# Patient Record
Sex: FEMALE | Race: WHITE | NOT HISPANIC OR LATINO | Employment: OTHER | URBAN - METROPOLITAN AREA
[De-identification: names, ages, dates, MRNs, and addresses within clinical notes are randomized per-mention and may not be internally consistent; named-entity substitution may affect disease eponyms.]

---

## 2023-04-02 DIAGNOSIS — E55.9 VITAMIN D DEFICIENCY, UNSPECIFIED: ICD-10-CM

## 2023-04-02 RX ORDER — ERGOCALCIFEROL 1.25 MG/1
CAPSULE ORAL
Qty: 14 CAPSULE | Refills: 1 | Status: SHIPPED | OUTPATIENT
Start: 2023-04-02

## 2023-05-26 DIAGNOSIS — L70.0 ACNE VULGARIS: ICD-10-CM

## 2023-05-26 RX ORDER — TRETINOIN 1 MG/G
CREAM TOPICAL
Qty: 45 G | Refills: 11 | Status: SHIPPED | OUTPATIENT
Start: 2023-05-26

## 2023-05-26 RX ORDER — TRETINOIN 1 MG/G
CREAM TOPICAL
COMMUNITY
End: 2023-07-11 | Stop reason: ALTCHOICE

## 2023-07-03 ENCOUNTER — PATIENT OUTREACH (OUTPATIENT)
Dept: PRIMARY CARE | Facility: CLINIC | Age: 23
End: 2023-07-03
Payer: COMMERCIAL

## 2023-07-03 DIAGNOSIS — N30.01 ACUTE CYSTITIS WITH HEMATURIA: Primary | ICD-10-CM

## 2023-07-03 RX ORDER — SULFAMETHOXAZOLE AND TRIMETHOPRIM 800; 160 MG/1; MG/1
TABLET ORAL EVERY 12 HOURS
COMMUNITY
Start: 2023-06-30 | End: 2023-07-10

## 2023-07-03 RX ORDER — NITROFURANTOIN 25; 75 MG/1; MG/1
100 CAPSULE ORAL 2 TIMES DAILY
Qty: 14 CAPSULE | Refills: 0 | Status: SHIPPED | OUTPATIENT
Start: 2023-07-03 | End: 2023-07-10

## 2023-07-03 NOTE — PROGRESS NOTES
"Discharge Facility: [facility]  Discharge Diagnosis:  Final Discharge Diagnoses: Nausea  Pyelonephritis sepsis.  Sepsis present on admission diagnosed clinically  Anemia  Hyperthyroidism on methimazole  PCOS  Anxiety  Depression  Severe obesity   Admission Date: 6-27-23  Discharge Date: 6-30-23    PCP Appointment Date: Message sent to office   Specialist Appointment Date:   Hospital Encounter and Summary: Linked   See discharge assessment below for further details  TCM complete. Patient is NOT scheduled for a hospital follow up due to no available appointments, task to office.  Patient discharged (6-30-23).  In order to bill as a TCM, the patient needs to be seen by (7-14-23).    Moderately complex & follow-up within 14 days- bill [id number] for hospital follow up.   Highly complex and follow-up visit within 7 days-can bill [id number] for hospital follow up    *virtual follow up needs modifier added (95 or GT)   *AWV AND TCM CAN BE BILLED TOGETHER WITH 25 MODIFIER   Engagement  Call Start Time: 1200 (7/3/2023 12:01 PM)    Medications  Medications reviewed with patient/caregiver?: Yes (7/3/2023 12:01 PM)  Is the patient having any side effects they believe may be caused by any medication additions or changes?: (!) Yes (7/3/2023 12:01 PM)  Does the patient have all medications ordered at discharge?: Yes (7/3/2023 12:01 PM)  Prescription Comments: Pt. taking Bactrim DS 800mg-160 and reports it is making her \"sick\" (7/3/2023 12:01 PM)  Is the patient taking all medications as directed (includes completed medication regime)?: Yes (7/3/2023 12:01 PM)  Care Management Interventions: Provided patient education; Notified provider (7/3/2023 12:01 PM)    Appointments  Does the patient have a primary care provider?: Yes (7/3/2023 12:01 PM)  Care Management Interventions: Educated patient on importance of making appointment (7/3/2023 12:01 PM)    Self Management  Has home health visited the patient within 72 hours of discharge?: Not " applicable (7/3/2023 12:01 PM)    Patient Teaching  Does the patient have access to their discharge instructions?: Yes (7/3/2023 12:01 PM)  Care Management Interventions: Reviewed instructions with patient (7/3/2023 12:01 PM)  What is the patient's perception of their health status since discharge?: Improving (7/3/2023 12:01 PM)  Is the patient/caregiver able to teach back the hierarchy of who to call/visit for symptoms/problems? PCP, Specialist, Home Health nurse, Urgent Care, ED, 911: Yes (7/3/2023 12:01 PM)      Chris Camarillo LPN

## 2023-07-10 PROBLEM — E53.8 VITAMIN B 12 DEFICIENCY: Status: ACTIVE | Noted: 2023-07-10

## 2023-07-10 PROBLEM — E55.9 VITAMIN D DEFICIENCY: Status: ACTIVE | Noted: 2023-07-10

## 2023-07-10 PROBLEM — F41.9 ANXIETY: Status: ACTIVE | Noted: 2023-07-10

## 2023-07-10 PROBLEM — E28.2 PCOS (POLYCYSTIC OVARIAN SYNDROME): Status: ACTIVE | Noted: 2023-07-10

## 2023-07-10 PROBLEM — L70.0 CYSTIC ACNE: Status: ACTIVE | Noted: 2023-07-10

## 2023-07-10 PROBLEM — E05.00 GRAVES DISEASE: Status: ACTIVE | Noted: 2023-07-10

## 2023-07-10 PROBLEM — F50.2: Status: ACTIVE | Noted: 2023-07-10

## 2023-07-10 PROBLEM — D64.9 ANEMIA: Status: ACTIVE | Noted: 2023-07-10

## 2023-07-10 RX ORDER — DESVENLAFAXINE 50 MG/1
TABLET, EXTENDED RELEASE ORAL
COMMUNITY
End: 2023-07-11 | Stop reason: ALTCHOICE

## 2023-07-10 RX ORDER — BUSPIRONE HYDROCHLORIDE 15 MG/1
1 TABLET ORAL 3 TIMES DAILY
COMMUNITY
Start: 2019-12-10 | End: 2023-07-11 | Stop reason: ALTCHOICE

## 2023-07-10 RX ORDER — METHIMAZOLE 5 MG/1
1 TABLET ORAL DAILY
COMMUNITY
Start: 2021-04-06

## 2023-07-10 RX ORDER — HYDROXYZINE HYDROCHLORIDE 25 MG/1
TABLET, FILM COATED ORAL 3 TIMES DAILY PRN
COMMUNITY

## 2023-07-11 ENCOUNTER — OFFICE VISIT (OUTPATIENT)
Dept: PRIMARY CARE | Facility: CLINIC | Age: 23
End: 2023-07-11
Payer: COMMERCIAL

## 2023-07-11 VITALS
SYSTOLIC BLOOD PRESSURE: 116 MMHG | WEIGHT: 256 LBS | HEIGHT: 65 IN | HEART RATE: 88 BPM | OXYGEN SATURATION: 99 % | BODY MASS INDEX: 42.65 KG/M2 | TEMPERATURE: 97 F | DIASTOLIC BLOOD PRESSURE: 80 MMHG

## 2023-07-11 DIAGNOSIS — N12 PYELONEPHRITIS: Primary | ICD-10-CM

## 2023-07-11 DIAGNOSIS — D64.9 ANEMIA, UNSPECIFIED TYPE: ICD-10-CM

## 2023-07-11 PROBLEM — F41.8 DEPRESSION WITH ANXIETY: Status: ACTIVE | Noted: 2023-07-11

## 2023-07-11 PROBLEM — R06.02 SHORTNESS OF BREATH: Status: ACTIVE | Noted: 2023-07-11

## 2023-07-11 PROBLEM — R53.83 MALAISE AND FATIGUE: Status: ACTIVE | Noted: 2023-07-11

## 2023-07-11 PROBLEM — R10.9 FLANK PAIN: Status: ACTIVE | Noted: 2023-07-11

## 2023-07-11 PROBLEM — J06.9 UPPER RESPIRATORY INFECTION, ACUTE: Status: ACTIVE | Noted: 2023-07-11

## 2023-07-11 PROBLEM — B34.9 VIRAL ILLNESS: Status: ACTIVE | Noted: 2023-07-11

## 2023-07-11 PROBLEM — F07.81 POST CONCUSSIVE SYNDROME: Status: ACTIVE | Noted: 2023-07-11

## 2023-07-11 PROBLEM — L30.9 DERMATITIS: Status: ACTIVE | Noted: 2023-07-11

## 2023-07-11 PROBLEM — E05.90 HYPERTHYROIDISM: Status: ACTIVE | Noted: 2023-07-11

## 2023-07-11 PROBLEM — R79.89 LOW TSH LEVEL: Status: ACTIVE | Noted: 2023-07-11

## 2023-07-11 PROBLEM — H60.542 ECZEMATOID OTITIS EXTERNA OF LEFT EAR: Status: ACTIVE | Noted: 2023-07-11

## 2023-07-11 PROBLEM — H53.9 CHANGE IN VISION: Status: ACTIVE | Noted: 2023-07-11

## 2023-07-11 PROBLEM — A41.9 SEPSIS (MULTI): Status: ACTIVE | Noted: 2023-07-11

## 2023-07-11 PROBLEM — B36.0 TINEA VERSICOLOR: Status: ACTIVE | Noted: 2023-07-11

## 2023-07-11 PROBLEM — R53.81 MALAISE AND FATIGUE: Status: ACTIVE | Noted: 2023-07-11

## 2023-07-11 PROBLEM — Z20.822 SUSPECTED COVID-19 VIRUS INFECTION: Status: ACTIVE | Noted: 2023-07-11

## 2023-07-11 PROBLEM — B34.9 NONSPECIFIC SYNDROME SUGGESTIVE OF VIRAL ILLNESS: Status: ACTIVE | Noted: 2023-07-11

## 2023-07-11 PROBLEM — K21.9 GERD (GASTROESOPHAGEAL REFLUX DISEASE): Status: ACTIVE | Noted: 2023-07-11

## 2023-07-11 PROBLEM — J06.9 URI WITH COUGH AND CONGESTION: Status: ACTIVE | Noted: 2023-07-11

## 2023-07-11 PROBLEM — R10.9 ABDOMINAL PAIN: Status: ACTIVE | Noted: 2023-07-11

## 2023-07-11 PROBLEM — R11.0 NAUSEA IN ADULT: Status: ACTIVE | Noted: 2023-07-11

## 2023-07-11 PROBLEM — Z20.822 EXPOSURE TO 2019 NOVEL CORONAVIRUS: Status: ACTIVE | Noted: 2023-07-11

## 2023-07-11 PROBLEM — Z90.49 STATUS POST CHOLECYSTECTOMY: Status: ACTIVE | Noted: 2023-07-11

## 2023-07-11 PROBLEM — L70.0 ACNE VULGARIS: Status: ACTIVE | Noted: 2023-07-11

## 2023-07-11 PROBLEM — J30.1 SEASONAL ALLERGIC RHINITIS DUE TO POLLEN: Status: ACTIVE | Noted: 2023-07-11

## 2023-07-11 PROCEDURE — 3008F BODY MASS INDEX DOCD: CPT | Performed by: FAMILY MEDICINE

## 2023-07-11 PROCEDURE — 99214 OFFICE O/P EST MOD 30 MIN: CPT | Performed by: FAMILY MEDICINE

## 2023-07-11 PROCEDURE — 1036F TOBACCO NON-USER: CPT | Performed by: FAMILY MEDICINE

## 2023-07-11 RX ORDER — ALBUTEROL SULFATE 90 UG/1
AEROSOL, METERED RESPIRATORY (INHALATION)
COMMUNITY
Start: 2022-01-20

## 2023-07-11 RX ORDER — ONDANSETRON 4 MG/1
TABLET, ORALLY DISINTEGRATING ORAL EVERY 6 HOURS
COMMUNITY
Start: 2023-06-30

## 2023-07-11 RX ORDER — PREDNISONE 20 MG/1
TABLET ORAL
COMMUNITY
Start: 2022-10-17 | End: 2023-07-11 | Stop reason: ALTCHOICE

## 2023-07-11 RX ORDER — MELOXICAM 7.5 MG/1
1 TABLET ORAL 2 TIMES DAILY
COMMUNITY
Start: 2022-10-19 | End: 2023-07-11 | Stop reason: ALTCHOICE

## 2023-07-11 RX ORDER — CYCLOBENZAPRINE HCL 10 MG
1 TABLET ORAL DAILY
COMMUNITY
Start: 2022-03-05 | End: 2023-07-11 | Stop reason: ALTCHOICE

## 2023-07-11 RX ORDER — CHLORHEXIDINE GLUCONATE ORAL RINSE 1.2 MG/ML
SOLUTION DENTAL
COMMUNITY
Start: 2022-11-02 | End: 2023-07-11 | Stop reason: ALTCHOICE

## 2023-07-11 RX ORDER — COLISTIN SULFATE, NEOMYCIN SULFATE, THONZONIUM BROMIDE AND HYDROCORTISONE ACETATE 3; 3.3; .5; 1 MG/ML; MG/ML; MG/ML; MG/ML
SUSPENSION AURICULAR (OTIC) 4 TIMES DAILY
COMMUNITY
Start: 2022-10-12 | End: 2023-07-11 | Stop reason: ALTCHOICE

## 2023-07-11 RX ORDER — BENZONATATE 200 MG/1
CAPSULE ORAL 3 TIMES DAILY PRN
COMMUNITY

## 2023-07-11 RX ORDER — PHENAZOPYRIDINE HYDROCHLORIDE 100 MG/1
TABLET, FILM COATED ORAL
COMMUNITY
End: 2023-07-11 | Stop reason: ALTCHOICE

## 2023-07-11 RX ORDER — MAGNESIUM 200 MG
TABLET ORAL
COMMUNITY
Start: 2021-04-16

## 2023-07-11 ASSESSMENT — PATIENT HEALTH QUESTIONNAIRE - PHQ9
1. LITTLE INTEREST OR PLEASURE IN DOING THINGS: NOT AT ALL
2. FEELING DOWN, DEPRESSED OR HOPELESS: NOT AT ALL
SUM OF ALL RESPONSES TO PHQ9 QUESTIONS 1 AND 2: 0

## 2023-07-11 NOTE — PROGRESS NOTES
Subjective   Patient ID: Radha Haskins is a 22 y.o. female who presents for Follow-up (Went to hospital for kidney infection. ).  HPI  Patient is following up after admission to the hospital for Nephritis.  When the antibiotic was switched she had improvement.  She has abd pain that is still sporadic.  It is occurring on both side.  Stopped bactrim due to SE of stomach pains and headache dizziness.  We stopped and started on Nitrofurnatoin.  Both were sensitive to the E coli.      [place] Cal[facility] tabs  Review of Systems    Objective   Physical Exam  General: Patient is alert and orient x3.  No acute distress.  Obese.    Neck: No adenopathy, thyroid normal in size, no nodules    Heart: Regular rate and rhythm no murmurs clicks or gallops    Lungs: Clear to auscultation bilaterally without rhonchi rales or wheezing    ABD: Soft, no tenderness except in the suprapubic region which is a discomfort, no rigidity rebound or guarding    Extremities: No cyanosis clubbing or edema    Psych: Normal affect  Assessment/Plan   Problem List Items Addressed This Visit       Anemia    Relevant Orders    CBC and Auto Differential    Comprehensive metabolic panel    Urinalysis with Reflex Microscopic    Pyelonephritis - Primary    Relevant Orders    CBC and Auto Differential    Comprehensive metabolic panel    Urinalysis with Reflex Microscopic   Reviewed hospital records and CAT scan with the patient  Labs were ordered to follow-up on infection  Patient is still having some mild abdominal pain and is still finishing up antibiotic  Follow-up as needed

## 2023-07-18 ENCOUNTER — PATIENT OUTREACH (OUTPATIENT)
Dept: PRIMARY CARE | Facility: CLINIC | Age: 23
End: 2023-07-18
Payer: COMMERCIAL

## 2023-07-18 NOTE — PROGRESS NOTES
Unable to reach patient for call back after patient's follow up appointment with PCP.   LVM with call back number for patient to call if needed   If no voicemail available call attempts x 2 were made to contact the patient to assist with any questions or concerns patient may have.   Chris Camarillo LPN

## 2023-07-27 ENCOUNTER — TELEMEDICINE (OUTPATIENT)
Dept: PRIMARY CARE | Facility: CLINIC | Age: 23
End: 2023-07-27
Payer: COMMERCIAL

## 2023-07-27 DIAGNOSIS — E05.00 GRAVES DISEASE: Primary | ICD-10-CM

## 2023-07-27 DIAGNOSIS — F41.8 DEPRESSION WITH ANXIETY: ICD-10-CM

## 2023-07-27 PROCEDURE — 99442 PR PHYS/QHP TELEPHONE EVALUATION 11-20 MIN: CPT | Performed by: FAMILY MEDICINE

## 2023-07-27 NOTE — PROGRESS NOTES
Subjective   Patient ID: Radha Haskins is a 22 y.o. female who presents for some new symptoms  HPI  Having more anxiety and fear of taking meds.  She feels like she is having rushes of adrenaline as she is going to sleep. She has a zap in her head and this happned 2-3 days for 2-3 seconds.  Having dizziness.  Had a panic attack but it took hydroxyzine and felt better and was able to sleep.  She is fearing falling asleep at night.    Review of Systems    Objective   Physical Exam    Assessment/Plan   Problem List Items Addressed This Visit       Graves disease - Primary    Depression with anxiety   Start Hydroxyzine 25 mg 3 x day  Leaving Aug 8th to [place]

## 2023-08-02 ENCOUNTER — PATIENT OUTREACH (OUTPATIENT)
Dept: PRIMARY CARE | Facility: CLINIC | Age: 23
End: 2023-08-02
Payer: COMMERCIAL

## 2023-08-02 NOTE — PROGRESS NOTES
Call placed regarding one month post discharge follow up call.  At time of outreach call the patient feels as if their condition has (returned to baseline) since initial visit with PCP or specialist.  Questions or concerns regarding recovery period addressed at this time. (Individualize as needed if questions arise)  Reviewed any PCP or specialists progress notes/labs/radiology reports if applicable and addressed any questions or concerns.  Chris Camarillo LPN

## 2023-10-02 ENCOUNTER — PATIENT OUTREACH (OUTPATIENT)
Dept: PRIMARY CARE | Facility: CLINIC | Age: 23
End: 2023-10-02
Payer: COMMERCIAL

## 2023-10-02 NOTE — PROGRESS NOTES
Patient has met target of no readmission for (90) days post hospital discharge and is graduated from Transitional Care Management program at this time.  Chris Camarillo LPN